# Patient Record
(demographics unavailable — no encounter records)

---

## 2025-07-12 NOTE — DISCUSSION/SUMMARY
[de-identified] :  Reviewed all MRI images with patient, interpretation was provided. patient is active and enjoys playing cricket  discussed importance of ligament stabilization of the knee for these activities Copy of Report provided. Email provided.     Surgical Consent:  -Conservative treatment, nontreatment, nonsurgical intervention and surgical intervention treatment options have been reviewed with the patient.  The patient continues to be symptomatic [and has failed conservative treatment], and elects to move forward with surgical intervention.  The patient is indicated for LEFT KNEE ACL RECONSTRUCTION WITH HAMSTRING ALLOGRAFT, ARTHROSCOPIC PARTIAL MEDIAL MENISCECTOMY, POSSIBLE ARTHROSCOPIC MEDIAL MENISCUS REPAIR, POSSIBLE MEDIAL MENISCUS ROOT REPAIR, ARTHROSCOPIC REMOVAL OF 11MM LOOSE BODY and all indicated procedures. As such the alternatives, benefits and risks, of the above procedure, including but not limited to bleeding, infection, neurovascular injury, loss of limb, loss of life,  DVT, PE, RSD, inability to return to previous level of activity, inability to return to previous level of employment, advancement of or to osteoarthritic changes, joint instability or motion loss, hardware failure or migration, failure to resolve all symptoms, failure to return to sports and need for further procedures, as well as specific risk of [RE-TEAR, OA, ATHROFIBROSIS, NEUROVASCULAR INJURY] were discussed with the patient and/or their legal guardian who agreed to move forward with surgical intervention.  They have reviewed and signed the consent form today after expressing understanding of the above documented conversation. The patient or their representative will contact my office as instructed on the preoperative instruction sheet they received today to schedule surgery in a timely manner as discussed.  -As a post-operative protocol, I am prescribing an iceless cold/heat compression therapy device for at home use to be used 3-5 times per day at 40 degrees for 35 days as an alternative to pain medication. I would like my patient to begin with simultaneous cold & compression therapy at 10mm pressure. At the patients follow up I will determine whether they should continue with cold, or if they should transition to contrast cold/heat compression therapy. Unlike a conventional cold therapy unit that requires ice, the ThermX iceless device is set to a prescribed temperature that it will remain throughout the entire duration of use, whether that be cold compression, heat compression, or contrast compression. Cold therapy units that depend on ice melt over a very short period and do not provide compression which limits the compliance and effectiveness for pain/inflammation reduction that I am targeting for my patient. I have reached out to [facility] [place] to supply this device as they are the exclusive provider of the ThermX and the patient will be contacted and instructed on how to utilize the device. ------------      ----------------------------------------------- Home Exercise The patient is instructed on a home exercise program.  MONROE HUDSON Acting as a Scribe for Dr. Saúl FOY, Monroe Hudson, attest that this documentation has been prepared under the direction and in the presence of Provider Ran Hays MD.  Activity Modification The patient was advised to modify their activities.  Dx / Natural History The patient was advised of the diagnosis.  The natural history of the pathology was explained in full to the patient in layman's terms.  Several different treatment options were discussed and explained in full to the patient including the risks and benefits of both surgical and non-surgical treatments.  All questions and concerns were answered.  Pain Guide Activities The patient was advised to let pain guide the gradual advancement of activities.  RICE I explained to the patient that rest, ice, compression, and elevation would benefit them.  They may return to activity after follow-up or when they no longer have any pain.  The patient's current medication management of their orthopedic diagnosis was reviewed today: (1) We discussed a comprehensive treatment plan that included possible pharmaceutical management involving the use of prescription strength medications including but not limited to options such as oral Naprosyn 500mg BID, once daily Meloxicam 15 mg, or 500-650 mg Tylenol versus over the counter oral medications and topical prescription NSAID Pennsaid vs over the counter Voltaren gel. (2) There is a moderate risk of morbidity with further treatment, especially from use of prescription strength medications and possible side effects of these medications which include upset stomach with oral medications, skin reactions to topical medications and cardiac/renal issues with long term use. (3) I recommended that the patient follow-up with their medical physician to discuss any significant specific potential issues with long term medication use such as interactions with current medications or with exacerbation of underlying medical comorbidities. (4) The benefits and risks associated with use of injectable, oral or topical, prescription and over the counter anti-inflammatory medications were discussed with the patient. The patient voiced understanding of the risks including but not limited to bleeding, stroke, kidney dysfunction, heart disease, and were referred to the black box warning label for further information.

## 2025-07-12 NOTE — IMAGING
[de-identified] : 07/08/2025: left knee x-rays, 4 views-This scan was reviewed and interpreted by Dr. Hays, and his findings are- unremarkable.

## 2025-07-12 NOTE — PHYSICAL EXAM
[de-identified] : Neurologic: normal coordination, normal DTR UE/LE , normal sensation, normal mood and affect, orientated and able to communicate. Constitutional: well developed and well nourished.   Left knee exam: large effusion Full ROM  +anterior draw + lachmann NVI distally stable to varus/valgus

## 2025-07-12 NOTE — IMAGING
[de-identified] : 07/08/2025: left knee x-rays, 4 views-This scan was reviewed and interpreted by Dr. Hays, and his findings are- unremarkable.

## 2025-07-12 NOTE — HISTORY OF PRESENT ILLNESS
[de-identified] : The patient is a 49 year old [RIGHT] hand dominant male who presents today complaining of left knee pain Date of Injury/Onset: 6wks ago Pain:    At Rest: 0/10  With Activity:  4/10  Mechanism of injury: pt was injured playing cricket, pt fell down and thinks he twisted the knee This is NOT a Work Related Injury being treated under Worker's Compensation. This is NOT an athletic injury occurring associated with an interscholastic or organized sports team. Quality of symptoms: tight, "a strain" Improves with: rest Worse with: walking [facility] Prior Imaging: None Out of work School/Sport/Position/Occupation:  Additional Information: unable to bend the knee back

## 2025-07-12 NOTE — HISTORY OF PRESENT ILLNESS
[de-identified] : 7/11/2025 MRI results, left knee   The patient is a 49 year old [RIGHT] hand dominant male who presents today complaining of left knee pain Date of Injury/Onset: 6wks ago Pain:    At Rest: 0/10  With Activity:  4/10  Mechanism of injury: pt was injured playing cricket, pt fell down and thinks he twisted the knee This is NOT a Work Related Injury being treated under Worker's Compensation. This is NOT an athletic injury occurring associated with an interscholastic or organized sports team. Quality of symptoms: tight, "a strain" Improves with: rest Worse with: walking Prior treatment: Urgent care, [facility] Prior Imaging: None Out of work School/Sport/Position/Occupation:  Additional Information: unable to bend the knee back

## 2025-07-12 NOTE — PHYSICAL EXAM
[de-identified] : Neurologic: normal coordination, normal DTR UE/LE , normal sensation, normal mood and affect, orientated and able to communicate. Constitutional: well developed and well nourished.   Left Knee:  Inspection of the knee is as follows: large effusion. Palpation of the knee is as follows: MCL tenderness. Knee Range of Motion is as follows: Extension: 0 degrees. Flexion: 110 degrees. There is guarding during exam, Anterior pain with flexion and Range of motion is limited secondary to pain. Strength examination of the knee is as follows: Quadriceps strength is 5/5 Hamstring strength is 5/5 Ligament Stability and Special Test positive anterior draw and positive Lachman test. negative posterior draw and no varus or valgus instability. 1+ valgus at 30 deg Neurological examination of the knee is as follows: light touch is intact throughout. Gait and function is as follows: mildly antalgic gait.  Medial joint line tenderness + locking Medial Kelly's test positive

## 2025-07-12 NOTE — DISCUSSION/SUMMARY
[de-identified] :  Reviewed all MRI images with patient, interpretation was provided. patient is active and enjoys playing cricket  discussed importance of ligament stabilization of the knee for these activities Copy of Report provided. Email provided.     Surgical Consent:  -Conservative treatment, nontreatment, nonsurgical intervention and surgical intervention treatment options have been reviewed with the patient.  The patient continues to be symptomatic [and has failed conservative treatment], and elects to move forward with surgical intervention.  The patient is indicated for LEFT KNEE ACL RECONSTRUCTION WITH HAMSTRING ALLOGRAFT, ARTHROSCOPIC PARTIAL MEDIAL MENISCECTOMY, POSSIBLE ARTHROSCOPIC MEDIAL MENISCUS REPAIR, POSSIBLE MEDIAL MENISCUS ROOT REPAIR, ARTHROSCOPIC REMOVAL OF 11MM LOOSE BODY and all indicated procedures. As such the alternatives, benefits and risks, of the above procedure, including but not limited to bleeding, infection, neurovascular injury, loss of limb, loss of life,  DVT, PE, RSD, inability to return to previous level of activity, inability to return to previous level of employment, advancement of or to osteoarthritic changes, joint instability or motion loss, hardware failure or migration, failure to resolve all symptoms, failure to return to sports and need for further procedures, as well as specific risk of [RE-TEAR, OA, ATHROFIBROSIS, NEUROVASCULAR INJURY] were discussed with the patient and/or their legal guardian who agreed to move forward with surgical intervention.  They have reviewed and signed the consent form today after expressing understanding of the above documented conversation. The patient or their representative will contact my office as instructed on the preoperative instruction sheet they received today to schedule surgery in a timely manner as discussed.  -As a post-operative protocol, I am prescribing an iceless cold/heat compression therapy device for at home use to be used 3-5 times per day at 40 degrees for 35 days as an alternative to pain medication. I would like my patient to begin with simultaneous cold & compression therapy at 10mm pressure. At the patients follow up I will determine whether they should continue with cold, or if they should transition to contrast cold/heat compression therapy. Unlike a conventional cold therapy unit that requires ice, the ThermX iceless device is set to a prescribed temperature that it will remain throughout the entire duration of use, whether that be cold compression, heat compression, or contrast compression. Cold therapy units that depend on ice melt over a very short period and do not provide compression which limits the compliance and effectiveness for pain/inflammation reduction that I am targeting for my patient. I have reached out to [facility] [place] to supply this device as they are the exclusive provider of the ThermX and the patient will be contacted and instructed on how to utilize the device. ------------      ----------------------------------------------- Home Exercise The patient is instructed on a home exercise program.  MONROE HUDSON Acting as a Scribe for Dr. Saúl FOY, Monroe Hudson, attest that this documentation has been prepared under the direction and in the presence of Provider Ran Hays MD.  Activity Modification The patient was advised to modify their activities.  Dx / Natural History The patient was advised of the diagnosis.  The natural history of the pathology was explained in full to the patient in layman's terms.  Several different treatment options were discussed and explained in full to the patient including the risks and benefits of both surgical and non-surgical treatments.  All questions and concerns were answered.  Pain Guide Activities The patient was advised to let pain guide the gradual advancement of activities.  RICE I explained to the patient that rest, ice, compression, and elevation would benefit them.  They may return to activity after follow-up or when they no longer have any pain.  The patient's current medication management of their orthopedic diagnosis was reviewed today: (1) We discussed a comprehensive treatment plan that included possible pharmaceutical management involving the use of prescription strength medications including but not limited to options such as oral Naprosyn 500mg BID, once daily Meloxicam 15 mg, or 500-650 mg Tylenol versus over the counter oral medications and topical prescription NSAID Pennsaid vs over the counter Voltaren gel. (2) There is a moderate risk of morbidity with further treatment, especially from use of prescription strength medications and possible side effects of these medications which include upset stomach with oral medications, skin reactions to topical medications and cardiac/renal issues with long term use. (3) I recommended that the patient follow-up with their medical physician to discuss any significant specific potential issues with long term medication use such as interactions with current medications or with exacerbation of underlying medical comorbidities. (4) The benefits and risks associated with use of injectable, oral or topical, prescription and over the counter anti-inflammatory medications were discussed with the patient. The patient voiced understanding of the risks including but not limited to bleeding, stroke, kidney dysfunction, heart disease, and were referred to the black box warning label for further information.

## 2025-07-12 NOTE — PHYSICAL EXAM
[de-identified] : Neurologic: normal coordination, normal DTR UE/LE , normal sensation, normal mood and affect, orientated and able to communicate. Constitutional: well developed and well nourished.   Left knee exam: large effusion Full ROM  +anterior draw + lachmann NVI distally stable to varus/valgus

## 2025-07-12 NOTE — IMAGING
[de-identified] : 07/08/2025: left knee x-rays, 4 views-This scan was reviewed and interpreted by Dr. Hays, and his findings are- unremarkable.

## 2025-07-12 NOTE — DISCUSSION/SUMMARY
[de-identified] : XRs were reviewed and discussed with my interpretation.  Obtain STAT MRI of Left knee. R/O ACL tear.  Follow up after MRI.   _________________ Home Exercise The patient is instructed on a home exercise program.  BRAD MAZARIEGOS Acting as a Scribe for Dr. Saúl FOY, Brad Mazariegos, attest that this documentation has been prepared under the direction and in the presence of Provider Ran Hays MD.  Activity Modification The patient was advised to modify their activities.  Dx / Natural History The patient was advised of the diagnosis.  The natural history of the pathology was explained in full to the patient in layman's terms.  Several different treatment options were discussed and explained in full to the patient including the risks and benefits of both surgical and non-surgical treatments.  All questions and concerns were answered.  Pain Guide Activities The patient was advised to let pain guide the gradual advancement of activities.  RICE I explained to the patient that rest, ice, compression, and elevation would benefit them.  They may return to activity after follow-up or when they no longer have any pain.  The patient's current medication management of their orthopedic diagnosis was reviewed today: (1) We discussed a comprehensive treatment plan that included possible pharmaceutical management involving the use of prescription strength medications including but not limited to options such as oral Naprosyn 500mg BID, once daily Meloxicam 15 mg, or 500-650 mg Tylenol versus over the counter oral medications and topical prescription NSAID Pennsaid vs over the counter Voltaren gel. (2) There is a moderate risk of morbidity with further treatment, especially from use of prescription strength medications and possible side effects of these medications which include upset stomach with oral medications, skin reactions to topical medications and cardiac/renal issues with long term use. (3) I recommended that the patient follow-up with their medical physician to discuss any significant specific potential issues with long term medication use such as interactions with current medications or with exacerbation of underlying medical comorbidities. (4) The benefits and risks associated with use of injectable, oral or topical, prescription and over the counter anti-inflammatory medications were discussed with the patient. The patient voiced understanding of the risks including but not limited to bleeding, stroke, kidney dysfunction, heart disease, and were referred to the black box warning label for further information.

## 2025-07-12 NOTE — IMAGING
[de-identified] : 07/08/2025: left knee x-rays, 4 views-This scan was reviewed and interpreted by Dr. Hays, and his findings are- unremarkable.

## 2025-07-12 NOTE — DISCUSSION/SUMMARY
[de-identified] : XRs were reviewed and discussed with my interpretation.  Obtain STAT MRI of Left knee. R/O ACL tear.  Follow up after MRI.   _________________ Home Exercise The patient is instructed on a home exercise program.  BRAD MAZRAIEGOS Acting as a Scribe for Dr. Saúl FOY, Brad Mazariegos, attest that this documentation has been prepared under the direction and in the presence of Provider Ran Hays MD.  Activity Modification The patient was advised to modify their activities.  Dx / Natural History The patient was advised of the diagnosis.  The natural history of the pathology was explained in full to the patient in layman's terms.  Several different treatment options were discussed and explained in full to the patient including the risks and benefits of both surgical and non-surgical treatments.  All questions and concerns were answered.  Pain Guide Activities The patient was advised to let pain guide the gradual advancement of activities.  RICE I explained to the patient that rest, ice, compression, and elevation would benefit them.  They may return to activity after follow-up or when they no longer have any pain.  The patient's current medication management of their orthopedic diagnosis was reviewed today: (1) We discussed a comprehensive treatment plan that included possible pharmaceutical management involving the use of prescription strength medications including but not limited to options such as oral Naprosyn 500mg BID, once daily Meloxicam 15 mg, or 500-650 mg Tylenol versus over the counter oral medications and topical prescription NSAID Pennsaid vs over the counter Voltaren gel. (2) There is a moderate risk of morbidity with further treatment, especially from use of prescription strength medications and possible side effects of these medications which include upset stomach with oral medications, skin reactions to topical medications and cardiac/renal issues with long term use. (3) I recommended that the patient follow-up with their medical physician to discuss any significant specific potential issues with long term medication use such as interactions with current medications or with exacerbation of underlying medical comorbidities. (4) The benefits and risks associated with use of injectable, oral or topical, prescription and over the counter anti-inflammatory medications were discussed with the patient. The patient voiced understanding of the risks including but not limited to bleeding, stroke, kidney dysfunction, heart disease, and were referred to the black box warning label for further information.

## 2025-07-12 NOTE — PHYSICAL EXAM
[de-identified] : Neurologic: normal coordination, normal DTR UE/LE , normal sensation, normal mood and affect, orientated and able to communicate. Constitutional: well developed and well nourished.   Left Knee:  Inspection of the knee is as follows: large effusion. Palpation of the knee is as follows: MCL tenderness. Knee Range of Motion is as follows: Extension: 0 degrees. Flexion: 110 degrees. There is guarding during exam, Anterior pain with flexion and Range of motion is limited secondary to pain. Strength examination of the knee is as follows: Quadriceps strength is 5/5 Hamstring strength is 5/5 Ligament Stability and Special Test positive anterior draw and positive Lachman test. negative posterior draw and no varus or valgus instability. 1+ valgus at 30 deg Neurological examination of the knee is as follows: light touch is intact throughout. Gait and function is as follows: mildly antalgic gait.  Medial joint line tenderness + locking Medial Kelly's test positive

## 2025-07-12 NOTE — HISTORY OF PRESENT ILLNESS
[de-identified] : The patient is a 49 year old [RIGHT] hand dominant male who presents today complaining of left knee pain Date of Injury/Onset: 6wks ago Pain:    At Rest: 0/10  With Activity:  4/10  Mechanism of injury: pt was injured playing cricket, pt fell down and thinks he twisted the knee This is NOT a Work Related Injury being treated under Worker's Compensation. This is NOT an athletic injury occurring associated with an interscholastic or organized sports team. Quality of symptoms: tight, "a strain" Improves with: rest Worse with: walking [facility] Prior Imaging: None Out of work School/Sport/Position/Occupation:  Additional Information: unable to bend the knee back

## 2025-07-12 NOTE — DATA REVIEWED
[FreeTextEntry1] : 07/08/25 [facility] MRI Left Knee: 1. Anterior cruciate ligament mucoid change with high-grade essentially complete proximal and central third tear. Contusion posterior lateral tibia with moderate joint effusion. 2. Medial meniscal tear. 3. Arthrosis with joint effusion. 4. Hamstring and gastrocnemius tendinopathy with insertional fraying and soft tissue edema. 5. Synovial thickening versus 11 mm loose body lateral suprapatellar recess.

## 2025-07-12 NOTE — HISTORY OF PRESENT ILLNESS
[de-identified] : 7/11/2025 MRI results, left knee   The patient is a 49 year old [RIGHT] hand dominant male who presents today complaining of left knee pain Date of Injury/Onset: 6wks ago Pain:    At Rest: 0/10  With Activity:  4/10  Mechanism of injury: pt was injured playing cricket, pt fell down and thinks he twisted the knee This is NOT a Work Related Injury being treated under Worker's Compensation. This is NOT an athletic injury occurring associated with an interscholastic or organized sports team. Quality of symptoms: tight, "a strain" Improves with: rest Worse with: walking Prior treatment: Urgent care, [facility] Prior Imaging: None Out of work School/Sport/Position/Occupation:  Additional Information: unable to bend the knee back